# Patient Record
Sex: FEMALE | Race: WHITE | Employment: FULL TIME | URBAN - METROPOLITAN AREA
[De-identification: names, ages, dates, MRNs, and addresses within clinical notes are randomized per-mention and may not be internally consistent; named-entity substitution may affect disease eponyms.]

---

## 2021-11-08 ENCOUNTER — HOSPITAL ENCOUNTER (OUTPATIENT)
Facility: CLINIC | Age: 56
Discharge: HOME | End: 2021-11-08
Attending: EMERGENCY MEDICINE | Admitting: SURGERY
Payer: OTHER GOVERNMENT

## 2021-11-08 ENCOUNTER — ANESTHESIA (OUTPATIENT)
Dept: SURGERY | Facility: CLINIC | Age: 56
End: 2021-11-08
Payer: OTHER GOVERNMENT

## 2021-11-08 ENCOUNTER — APPOINTMENT (OUTPATIENT)
Dept: CT IMAGING | Facility: CLINIC | Age: 56
End: 2021-11-08
Attending: EMERGENCY MEDICINE
Payer: OTHER GOVERNMENT

## 2021-11-08 ENCOUNTER — ANESTHESIA EVENT (OUTPATIENT)
Dept: SURGERY | Facility: CLINIC | Age: 56
End: 2021-11-08
Payer: OTHER GOVERNMENT

## 2021-11-08 VITALS
SYSTOLIC BLOOD PRESSURE: 120 MMHG | BODY MASS INDEX: 28.17 KG/M2 | HEART RATE: 70 BPM | OXYGEN SATURATION: 99 % | TEMPERATURE: 96.9 F | RESPIRATION RATE: 12 BRPM | DIASTOLIC BLOOD PRESSURE: 68 MMHG | WEIGHT: 165 LBS | HEIGHT: 64 IN

## 2021-11-08 DIAGNOSIS — K37 APPENDICITIS, UNSPECIFIED APPENDICITIS TYPE: ICD-10-CM

## 2021-11-08 LAB
ALBUMIN UR-MCNC: 30 MG/DL
ANION GAP SERPL CALCULATED.3IONS-SCNC: 6 MMOL/L (ref 3–14)
APPEARANCE UR: CLEAR
BASOPHILS # BLD AUTO: 0 10E3/UL (ref 0–0.2)
BASOPHILS NFR BLD AUTO: 0 %
BILIRUB UR QL STRIP: NEGATIVE
BUN SERPL-MCNC: 13 MG/DL (ref 7–30)
CALCIUM SERPL-MCNC: 9.2 MG/DL (ref 8.5–10.1)
CHLORIDE BLD-SCNC: 106 MMOL/L (ref 94–109)
CO2 SERPL-SCNC: 26 MMOL/L (ref 20–32)
COLOR UR AUTO: YELLOW
CREAT SERPL-MCNC: 0.81 MG/DL (ref 0.52–1.04)
EOSINOPHIL # BLD AUTO: 0 10E3/UL (ref 0–0.7)
EOSINOPHIL NFR BLD AUTO: 0 %
ERYTHROCYTE [DISTWIDTH] IN BLOOD BY AUTOMATED COUNT: 12.3 % (ref 10–15)
GFR SERPL CREATININE-BSD FRML MDRD: 82 ML/MIN/1.73M2
GLUCOSE BLD-MCNC: 129 MG/DL (ref 70–99)
GLUCOSE UR STRIP-MCNC: NEGATIVE MG/DL
HCT VFR BLD AUTO: 42.2 % (ref 35–47)
HGB BLD-MCNC: 14.4 G/DL (ref 11.7–15.7)
HGB UR QL STRIP: ABNORMAL
HOLD SPECIMEN: NORMAL
IMM GRANULOCYTES # BLD: 0.1 10E3/UL
IMM GRANULOCYTES NFR BLD: 0 %
KETONES UR STRIP-MCNC: 100 MG/DL
LEUKOCYTE ESTERASE UR QL STRIP: NEGATIVE
LYMPHOCYTES # BLD AUTO: 0.8 10E3/UL (ref 0.8–5.3)
LYMPHOCYTES NFR BLD AUTO: 5 %
MCH RBC QN AUTO: 30.1 PG (ref 26.5–33)
MCHC RBC AUTO-ENTMCNC: 34.1 G/DL (ref 31.5–36.5)
MCV RBC AUTO: 88 FL (ref 78–100)
MONOCYTES # BLD AUTO: 0.9 10E3/UL (ref 0–1.3)
MONOCYTES NFR BLD AUTO: 6 %
MUCOUS THREADS #/AREA URNS LPF: PRESENT /LPF
NEUTROPHILS # BLD AUTO: 12.9 10E3/UL (ref 1.6–8.3)
NEUTROPHILS NFR BLD AUTO: 89 %
NITRATE UR QL: NEGATIVE
NRBC # BLD AUTO: 0 10E3/UL
NRBC BLD AUTO-RTO: 0 /100
PH UR STRIP: 6.5 [PH] (ref 5–7)
PLATELET # BLD AUTO: 319 10E3/UL (ref 150–450)
POTASSIUM BLD-SCNC: 3.5 MMOL/L (ref 3.4–5.3)
RBC # BLD AUTO: 4.78 10E6/UL (ref 3.8–5.2)
RBC URINE: 21 /HPF
SARS-COV-2 RNA RESP QL NAA+PROBE: NEGATIVE
SODIUM SERPL-SCNC: 138 MMOL/L (ref 133–144)
SP GR UR STRIP: 1.03 (ref 1–1.03)
SQUAMOUS EPITHELIAL: 5 /HPF
UROBILINOGEN UR STRIP-MCNC: NORMAL MG/DL
WBC # BLD AUTO: 14.6 10E3/UL (ref 4–11)
WBC URINE: 3 /HPF

## 2021-11-08 PROCEDURE — 96374 THER/PROPH/DIAG INJ IV PUSH: CPT | Mod: 59

## 2021-11-08 PROCEDURE — 250N000009 HC RX 250: Performed by: NURSE ANESTHETIST, CERTIFIED REGISTERED

## 2021-11-08 PROCEDURE — 250N000011 HC RX IP 250 OP 636: Performed by: NURSE ANESTHETIST, CERTIFIED REGISTERED

## 2021-11-08 PROCEDURE — 74177 CT ABD & PELVIS W/CONTRAST: CPT

## 2021-11-08 PROCEDURE — 370N000017 HC ANESTHESIA TECHNICAL FEE, PER MIN: Performed by: SURGERY

## 2021-11-08 PROCEDURE — 250N000011 HC RX IP 250 OP 636: Performed by: EMERGENCY MEDICINE

## 2021-11-08 PROCEDURE — 258N000003 HC RX IP 258 OP 636: Performed by: NURSE ANESTHETIST, CERTIFIED REGISTERED

## 2021-11-08 PROCEDURE — C9803 HOPD COVID-19 SPEC COLLECT: HCPCS

## 2021-11-08 PROCEDURE — 258N000001 HC RX 258: Performed by: SURGERY

## 2021-11-08 PROCEDURE — 81001 URINALYSIS AUTO W/SCOPE: CPT | Performed by: EMERGENCY MEDICINE

## 2021-11-08 PROCEDURE — 258N000003 HC RX IP 258 OP 636: Performed by: ANESTHESIOLOGY

## 2021-11-08 PROCEDURE — 96361 HYDRATE IV INFUSION ADD-ON: CPT | Mod: 59

## 2021-11-08 PROCEDURE — 44970 LAPAROSCOPY APPENDECTOMY: CPT | Performed by: SURGERY

## 2021-11-08 PROCEDURE — 250N000009 HC RX 250: Performed by: SURGERY

## 2021-11-08 PROCEDURE — 96375 TX/PRO/DX INJ NEW DRUG ADDON: CPT | Mod: 59

## 2021-11-08 PROCEDURE — 710N000009 HC RECOVERY PHASE 1, LEVEL 1, PER MIN: Performed by: SURGERY

## 2021-11-08 PROCEDURE — 99285 EMERGENCY DEPT VISIT HI MDM: CPT | Mod: 25

## 2021-11-08 PROCEDURE — 360N000076 HC SURGERY LEVEL 3, PER MIN: Performed by: SURGERY

## 2021-11-08 PROCEDURE — 258N000003 HC RX IP 258 OP 636: Performed by: EMERGENCY MEDICINE

## 2021-11-08 PROCEDURE — 80048 BASIC METABOLIC PNL TOTAL CA: CPT | Performed by: EMERGENCY MEDICINE

## 2021-11-08 PROCEDURE — 272N000001 HC OR GENERAL SUPPLY STERILE: Performed by: SURGERY

## 2021-11-08 PROCEDURE — 36415 COLL VENOUS BLD VENIPUNCTURE: CPT | Performed by: EMERGENCY MEDICINE

## 2021-11-08 PROCEDURE — 250N000009 HC RX 250: Performed by: ANESTHESIOLOGY

## 2021-11-08 PROCEDURE — 250N000009 HC RX 250: Performed by: EMERGENCY MEDICINE

## 2021-11-08 PROCEDURE — 999N000141 HC STATISTIC PRE-PROCEDURE NURSING ASSESSMENT: Performed by: SURGERY

## 2021-11-08 PROCEDURE — 710N000012 HC RECOVERY PHASE 2, PER MINUTE: Performed by: SURGERY

## 2021-11-08 PROCEDURE — 88304 TISSUE EXAM BY PATHOLOGIST: CPT | Mod: TC | Performed by: SURGERY

## 2021-11-08 PROCEDURE — 87635 SARS-COV-2 COVID-19 AMP PRB: CPT | Performed by: EMERGENCY MEDICINE

## 2021-11-08 PROCEDURE — 85025 COMPLETE CBC W/AUTO DIFF WBC: CPT | Performed by: EMERGENCY MEDICINE

## 2021-11-08 RX ORDER — PHENYLEPHRINE HYDROCHLORIDE 10 MG/ML
INJECTION INTRAVENOUS PRN
Status: DISCONTINUED | OUTPATIENT
Start: 2021-11-08 | End: 2021-11-08

## 2021-11-08 RX ORDER — ONDANSETRON 4 MG/1
4-8 TABLET, ORALLY DISINTEGRATING ORAL EVERY 8 HOURS PRN
Qty: 4 TABLET | Refills: 0 | Status: SHIPPED | OUTPATIENT
Start: 2021-11-08

## 2021-11-08 RX ORDER — IBUPROFEN 600 MG/1
600 TABLET, FILM COATED ORAL EVERY 6 HOURS PRN
Qty: 30 TABLET | Refills: 0 | COMMUNITY
Start: 2021-11-08

## 2021-11-08 RX ORDER — NALOXONE HYDROCHLORIDE 0.4 MG/ML
0.2 INJECTION, SOLUTION INTRAMUSCULAR; INTRAVENOUS; SUBCUTANEOUS
Status: DISCONTINUED | OUTPATIENT
Start: 2021-11-08 | End: 2021-11-08 | Stop reason: HOSPADM

## 2021-11-08 RX ORDER — HYDROMORPHONE HYDROCHLORIDE 1 MG/ML
0.5 INJECTION, SOLUTION INTRAMUSCULAR; INTRAVENOUS; SUBCUTANEOUS EVERY 4 HOURS PRN
Status: DISCONTINUED | OUTPATIENT
Start: 2021-11-08 | End: 2021-11-08 | Stop reason: HOSPADM

## 2021-11-08 RX ORDER — OXYCODONE HYDROCHLORIDE 5 MG/1
5 TABLET ORAL EVERY 4 HOURS PRN
Status: DISCONTINUED | OUTPATIENT
Start: 2021-11-08 | End: 2021-11-08 | Stop reason: HOSPADM

## 2021-11-08 RX ORDER — FENTANYL CITRATE 50 UG/ML
INJECTION, SOLUTION INTRAMUSCULAR; INTRAVENOUS PRN
Status: DISCONTINUED | OUTPATIENT
Start: 2021-11-08 | End: 2021-11-08

## 2021-11-08 RX ORDER — NEOSTIGMINE METHYLSULFATE 1 MG/ML
VIAL (ML) INJECTION PRN
Status: DISCONTINUED | OUTPATIENT
Start: 2021-11-08 | End: 2021-11-08

## 2021-11-08 RX ORDER — ACETAMINOPHEN 325 MG/1
650 TABLET ORAL
Status: DISCONTINUED | OUTPATIENT
Start: 2021-11-08 | End: 2021-11-08 | Stop reason: HOSPADM

## 2021-11-08 RX ORDER — DEXAMETHASONE SODIUM PHOSPHATE 4 MG/ML
INJECTION, SOLUTION INTRA-ARTICULAR; INTRALESIONAL; INTRAMUSCULAR; INTRAVENOUS; SOFT TISSUE PRN
Status: DISCONTINUED | OUTPATIENT
Start: 2021-11-08 | End: 2021-11-08

## 2021-11-08 RX ORDER — ONDANSETRON 2 MG/ML
INJECTION INTRAMUSCULAR; INTRAVENOUS PRN
Status: DISCONTINUED | OUTPATIENT
Start: 2021-11-08 | End: 2021-11-08

## 2021-11-08 RX ORDER — ACETAMINOPHEN 325 MG/1
650 TABLET ORAL EVERY 4 HOURS PRN
Qty: 50 TABLET | Refills: 0 | COMMUNITY
Start: 2021-11-08

## 2021-11-08 RX ORDER — SODIUM CHLORIDE, SODIUM LACTATE, POTASSIUM CHLORIDE, CALCIUM CHLORIDE 600; 310; 30; 20 MG/100ML; MG/100ML; MG/100ML; MG/100ML
INJECTION, SOLUTION INTRAVENOUS CONTINUOUS
Status: DISCONTINUED | OUTPATIENT
Start: 2021-11-08 | End: 2021-11-08 | Stop reason: HOSPADM

## 2021-11-08 RX ORDER — IBUPROFEN 200 MG
600 TABLET ORAL DAILY
COMMUNITY

## 2021-11-08 RX ORDER — LIDOCAINE 40 MG/G
CREAM TOPICAL
Status: DISCONTINUED | OUTPATIENT
Start: 2021-11-08 | End: 2021-11-08 | Stop reason: HOSPADM

## 2021-11-08 RX ORDER — GLYCOPYRROLATE 0.2 MG/ML
INJECTION, SOLUTION INTRAMUSCULAR; INTRAVENOUS PRN
Status: DISCONTINUED | OUTPATIENT
Start: 2021-11-08 | End: 2021-11-08

## 2021-11-08 RX ORDER — FENTANYL CITRATE 50 UG/ML
50 INJECTION, SOLUTION INTRAMUSCULAR; INTRAVENOUS EVERY 5 MIN PRN
Status: DISCONTINUED | OUTPATIENT
Start: 2021-11-08 | End: 2021-11-08 | Stop reason: HOSPADM

## 2021-11-08 RX ORDER — ONDANSETRON 2 MG/ML
4 INJECTION INTRAMUSCULAR; INTRAVENOUS ONCE
Status: COMPLETED | OUTPATIENT
Start: 2021-11-08 | End: 2021-11-08

## 2021-11-08 RX ORDER — KETOROLAC TROMETHAMINE 15 MG/ML
15 INJECTION, SOLUTION INTRAMUSCULAR; INTRAVENOUS ONCE
Status: COMPLETED | OUTPATIENT
Start: 2021-11-08 | End: 2021-11-08

## 2021-11-08 RX ORDER — PROPOFOL 10 MG/ML
INJECTION, EMULSION INTRAVENOUS PRN
Status: DISCONTINUED | OUTPATIENT
Start: 2021-11-08 | End: 2021-11-08

## 2021-11-08 RX ORDER — FENTANYL CITRATE 50 UG/ML
25 INJECTION, SOLUTION INTRAMUSCULAR; INTRAVENOUS
Status: DISCONTINUED | OUTPATIENT
Start: 2021-11-08 | End: 2021-11-08 | Stop reason: HOSPADM

## 2021-11-08 RX ORDER — ONDANSETRON 4 MG/1
4 TABLET, ORALLY DISINTEGRATING ORAL EVERY 30 MIN PRN
Status: DISCONTINUED | OUTPATIENT
Start: 2021-11-08 | End: 2021-11-08 | Stop reason: HOSPADM

## 2021-11-08 RX ORDER — LABETALOL HYDROCHLORIDE 5 MG/ML
10 INJECTION, SOLUTION INTRAVENOUS EVERY 5 MIN PRN
Status: DISCONTINUED | OUTPATIENT
Start: 2021-11-08 | End: 2021-11-08 | Stop reason: HOSPADM

## 2021-11-08 RX ORDER — HYDROMORPHONE HYDROCHLORIDE 1 MG/ML
0.5 INJECTION, SOLUTION INTRAMUSCULAR; INTRAVENOUS; SUBCUTANEOUS ONCE
Status: COMPLETED | OUTPATIENT
Start: 2021-11-08 | End: 2021-11-08

## 2021-11-08 RX ORDER — ONDANSETRON 2 MG/ML
4 INJECTION INTRAMUSCULAR; INTRAVENOUS EVERY 6 HOURS PRN
Status: DISCONTINUED | OUTPATIENT
Start: 2021-11-08 | End: 2021-11-08 | Stop reason: HOSPADM

## 2021-11-08 RX ORDER — BUPIVACAINE HYDROCHLORIDE AND EPINEPHRINE 2.5; 5 MG/ML; UG/ML
30 INJECTION, SOLUTION EPIDURAL; INFILTRATION; INTRACAUDAL; PERINEURAL ONCE
Status: COMPLETED | OUTPATIENT
Start: 2021-11-08 | End: 2021-11-08

## 2021-11-08 RX ORDER — OXYCODONE HYDROCHLORIDE 5 MG/1
5-10 TABLET ORAL EVERY 4 HOURS PRN
Qty: 6 TABLET | Refills: 0 | Status: SHIPPED | OUTPATIENT
Start: 2021-11-08

## 2021-11-08 RX ORDER — NALOXONE HYDROCHLORIDE 0.4 MG/ML
0.4 INJECTION, SOLUTION INTRAMUSCULAR; INTRAVENOUS; SUBCUTANEOUS
Status: DISCONTINUED | OUTPATIENT
Start: 2021-11-08 | End: 2021-11-08 | Stop reason: HOSPADM

## 2021-11-08 RX ORDER — ONDANSETRON 2 MG/ML
4 INJECTION INTRAMUSCULAR; INTRAVENOUS EVERY 30 MIN PRN
Status: DISCONTINUED | OUTPATIENT
Start: 2021-11-08 | End: 2021-11-08 | Stop reason: HOSPADM

## 2021-11-08 RX ORDER — IOPAMIDOL 755 MG/ML
500 INJECTION, SOLUTION INTRAVASCULAR ONCE
Status: COMPLETED | OUTPATIENT
Start: 2021-11-08 | End: 2021-11-08

## 2021-11-08 RX ORDER — SODIUM CHLORIDE, SODIUM LACTATE, POTASSIUM CHLORIDE, CALCIUM CHLORIDE 600; 310; 30; 20 MG/100ML; MG/100ML; MG/100ML; MG/100ML
INJECTION, SOLUTION INTRAVENOUS CONTINUOUS PRN
Status: DISCONTINUED | OUTPATIENT
Start: 2021-11-08 | End: 2021-11-08

## 2021-11-08 RX ORDER — ALBUTEROL SULFATE 0.83 MG/ML
2.5 SOLUTION RESPIRATORY (INHALATION) EVERY 4 HOURS PRN
Status: DISCONTINUED | OUTPATIENT
Start: 2021-11-08 | End: 2021-11-08 | Stop reason: HOSPADM

## 2021-11-08 RX ORDER — MEPERIDINE HYDROCHLORIDE 25 MG/ML
12.5 INJECTION INTRAMUSCULAR; INTRAVENOUS; SUBCUTANEOUS
Status: DISCONTINUED | OUTPATIENT
Start: 2021-11-08 | End: 2021-11-08 | Stop reason: HOSPADM

## 2021-11-08 RX ADMIN — GLYCOPYRROLATE 0.2 MG: 0.2 INJECTION, SOLUTION INTRAMUSCULAR; INTRAVENOUS at 05:15

## 2021-11-08 RX ADMIN — LIDOCAINE HYDROCHLORIDE 50 MG: 10 INJECTION, SOLUTION EPIDURAL; INFILTRATION; INTRACAUDAL; PERINEURAL at 05:02

## 2021-11-08 RX ADMIN — PHENYLEPHRINE HYDROCHLORIDE 100 MCG: 10 INJECTION INTRAVENOUS at 05:12

## 2021-11-08 RX ADMIN — IOPAMIDOL 85 ML: 755 INJECTION, SOLUTION INTRAVENOUS at 03:07

## 2021-11-08 RX ADMIN — DEXAMETHASONE SODIUM PHOSPHATE 4 MG: 4 INJECTION, SOLUTION INTRA-ARTICULAR; INTRALESIONAL; INTRAMUSCULAR; INTRAVENOUS; SOFT TISSUE at 05:04

## 2021-11-08 RX ADMIN — KETOROLAC TROMETHAMINE 15 MG: 15 INJECTION, SOLUTION INTRAMUSCULAR; INTRAVENOUS at 02:25

## 2021-11-08 RX ADMIN — SODIUM CHLORIDE, POTASSIUM CHLORIDE, SODIUM LACTATE AND CALCIUM CHLORIDE: 600; 310; 30; 20 INJECTION, SOLUTION INTRAVENOUS at 05:10

## 2021-11-08 RX ADMIN — TAZOBACTAM SODIUM AND PIPERACILLIN SODIUM 4.5 G: 500; 4 INJECTION, SOLUTION INTRAVENOUS at 03:51

## 2021-11-08 RX ADMIN — GLYCOPYRROLATE 0.2 MG: 0.2 INJECTION, SOLUTION INTRAMUSCULAR; INTRAVENOUS at 05:58

## 2021-11-08 RX ADMIN — SODIUM CHLORIDE 61 ML: 9 INJECTION, SOLUTION INTRAVENOUS at 03:07

## 2021-11-08 RX ADMIN — FENTANYL CITRATE 50 MCG: 50 INJECTION, SOLUTION INTRAMUSCULAR; INTRAVENOUS at 05:28

## 2021-11-08 RX ADMIN — SODIUM CHLORIDE 1000 ML: 9 INJECTION, SOLUTION INTRAVENOUS at 02:24

## 2021-11-08 RX ADMIN — FENTANYL CITRATE 50 MCG: 50 INJECTION, SOLUTION INTRAMUSCULAR; INTRAVENOUS at 05:34

## 2021-11-08 RX ADMIN — ONDANSETRON 4 MG: 2 INJECTION INTRAMUSCULAR; INTRAVENOUS at 02:24

## 2021-11-08 RX ADMIN — HYDROMORPHONE HYDROCHLORIDE 0.5 MG: 1 INJECTION, SOLUTION INTRAMUSCULAR; INTRAVENOUS; SUBCUTANEOUS at 02:27

## 2021-11-08 RX ADMIN — SODIUM CHLORIDE, POTASSIUM CHLORIDE, SODIUM LACTATE AND CALCIUM CHLORIDE: 600; 310; 30; 20 INJECTION, SOLUTION INTRAVENOUS at 05:12

## 2021-11-08 RX ADMIN — ONDANSETRON HYDROCHLORIDE 4 MG: 2 INJECTION, SOLUTION INTRAVENOUS at 05:52

## 2021-11-08 RX ADMIN — PROPOFOL 200 MG: 10 INJECTION, EMULSION INTRAVENOUS at 05:02

## 2021-11-08 RX ADMIN — NEOSTIGMINE METHYLSULFATE 2 MG: 1 INJECTION, SOLUTION INTRAVENOUS at 05:58

## 2021-11-08 RX ADMIN — ROCURONIUM BROMIDE 50 MG: 50 INJECTION, SOLUTION INTRAVENOUS at 05:03

## 2021-11-08 RX ADMIN — FENTANYL CITRATE 50 MCG: 50 INJECTION, SOLUTION INTRAMUSCULAR; INTRAVENOUS at 05:02

## 2021-11-08 RX ADMIN — PHENYLEPHRINE HYDROCHLORIDE 100 MCG: 10 INJECTION INTRAVENOUS at 05:15

## 2021-11-08 ASSESSMENT — ENCOUNTER SYMPTOMS
DYSURIA: 0
CHILLS: 0
NAUSEA: 1
SHORTNESS OF BREATH: 0
DIARRHEA: 0
FEVER: 0
COUGH: 0
ABDOMINAL PAIN: 1
VOMITING: 1

## 2021-11-08 ASSESSMENT — MIFFLIN-ST. JEOR: SCORE: 1328.44

## 2021-11-08 NOTE — ANESTHESIA CARE TRANSFER NOTE
Patient: Alona Yoder    Procedure: Procedure(s):  APPENDECTOMY, LAPAROSCOPIC       Diagnosis: * No pre-op diagnosis entered *  Diagnosis Additional Information: No value filed.    Anesthesia Type:   General     Note:    Oropharynx: oropharynx clear of all foreign objects and spontaneously breathing  Level of Consciousness: awake  Oxygen Supplementation: face mask  Level of Supplemental Oxygen (L/min / FiO2): 6  Independent Airway: airway patency satisfactory and stable  Dentition: dentition unchanged  Vital Signs Stable: post-procedure vital signs reviewed and stable  Report to RN Given: handoff report given  Patient transferred to: PACU  Comments: No issues, denies pain   Handoff Report: Identifed the Patient, Identified the Reponsible Provider, Reviewed the pertinent medical history, Discussed the surgical course, Reviewed Intra-OP anesthesia mangement and issues during anesthesia and Allowed opportunity for questions and acknowledgement of understanding      Vitals:  Vitals Value Taken Time   BP     Temp     Pulse     Resp     SpO2         Electronically Signed By: NORTH Dewitt CRNA  November 8, 2021  6:11 AM

## 2021-11-08 NOTE — H&P
[facility]  General Surgery Consult    Alona Yoder MRN# [id number]   Age: 56 year old YOB: 1965     HPI:  The patient has been experiencing abdominal pain for the past 2 days. The pain started RLQ and is currently in the RLQ. Started while at work on a night shift Friday into Saturday am. The pain associated with nausea and vomiting, many times over the past day.  Negative for associated fever, diarrhea, constipation, shortness of breath, dysuria    History is obtained from the patient.  Daughter at bedside  She is vaccinated for COVID-19    Review Of Systems:  The 10 point review of systems is negative other than noted in the HPI.    PMH:  No past medical history on file.    PSH:  Knee surgeries distant past    Allergies:  No Known Allergies    Home Medications:  No current outpatient medications on file.       Social History:  No smoking   No ETOH    Family History:  No family history on file.   Mom has heart disease    No family history chronic diarrhea, inflammatory bowel disease or colon cancer.    No bleeding disorders, clotting disorders, or problems with anesthesia.    Physical Exam:  /67   Pulse 74   Temp 97.7  F (36.5  C) (Oral)   Resp 14   LMP 11/08/2020   SpO2 91%   General appearance: Resting Comfortably in bed, no apparent distress  Eyes: conjunctiva clear, pupils equally round and reactive.   Mouth: Mucus membranes moist.  Neck: without lymphadenopathy or masses  Lungs: Clear to auscultation bilaterally  Heart: regular rate and rhythm, no murmurs, rubs, or gallops  Abdomen: flat   Tenderness: present: RLQ mild, negative rebound tenderness   Masses: none   Organomegaly: none  Extremities: Without clubbing, cyanosis, edema  Neurologic: Grossly intact times four extremities, alert and oriented times three  Psychiatric: Mood and affect are appropriate  Skin: Without lesions or rashes      Labs Reviewed:  Lab Results   Component Value Date    WBC 14.6 11/08/2021      Lab Results   Component Value Date    HGB 14.4 11/08/2021     Lab Results   Component Value Date     11/08/2021     Last Basic Metabolic Panel:  Lab Results   Component Value Date     11/08/2021      Lab Results   Component Value Date    POTASSIUM 3.5 11/08/2021     Lab Results   Component Value Date    CHLORIDE 106 11/08/2021     Lab Results   Component Value Date    MODESTO 9.2 11/08/2021     Lab Results   Component Value Date    CO2 26 11/08/2021     Lab Results   Component Value Date    BUN 13 11/08/2021     Lab Results   Component Value Date    CR 0.81 11/08/2021     Lab Results   Component Value Date     11/08/2021       Radiology:  All imaging studies reviewed by me.    Results for orders placed or performed during the hospital encounter of 11/08/21   CT Abdomen Pelvis w Contrast    Narrative    EXAM: CT ABDOMEN PELVIS W CONTRAST  LOCATION: [facility]  DATE/TIME: 11/8/2021 3:03 AM    INDICATION: RLQ abdominal pain, appendicitis suspected (Age >= 14y).  COMPARISON: None.  TECHNIQUE: CT scan of the abdomen and pelvis was performed following injection of IV contrast. Multiplanar reformats were obtained. Dose reduction techniques were used.  CONTRAST: 85 mL Isovue-370.    FINDINGS:   LOWER CHEST: Normal.    HEPATOBILIARY: Normal.    PANCREAS: Normal.    SPLEEN: Normal.    ADRENAL GLANDS: Normal.    KIDNEYS/BLADDER: Normal.    BOWEL: Evidence for acute uncomplicated appendicitis with appendiceal dilatation at 0.9 cm within moderate surrounding inflammatory edema and wall thickening. No perforation or abscess formation. No bowel dilatation or other sites of inflammatory change.    LYMPH NODES: No lymphadenopathy.    VASCULATURE: Normal caliber abdominal aorta. Moderate atherosclerotic vascular calcification.    PELVIC ORGANS: Minimal free fluid in the pelvis.    MUSCULOSKELETAL: Minimal degenerative changes lower lumbar facets.      Impression    IMPRESSION:   1.  Acute  uncomplicated appendicitis with appendiceal dilatation at 0.9 cm with wall thickening, mucosal hyperenhancement and moderate surrounding inflammatory edema. No evidence for luminal appendicolith.    2.  Moderate atherosclerotic vascular calcification of the abdominal aorta.         ASSESSMENT/PLAN:  The patient's history, physical exam, laboratory and imaging studies are suspicious for acute appendicitis.  I have offered the patient a laparoscopic appendectomy.      We have had a detailed discussion of nature of appendicitis, the procedure, its risks, benefits, alternatives, recovery, postop limitations, anesthesia, bleeding,  DVT, postoperative infections, injury to adjacent organs and structures, open conversion, bowel resection, prolonged convalescence in the event of gangrene or perforation of the appendix, abdominal wall hernia.   All questions have been answered to the best of my ability.     COVID-19 testing done due to pandemic and was negative prior to surgery     She elects to proceed.        Stacy Douglas MD    Time spent with the patient, reviewing the EMR, reviewing laboratory and imaging studies, more than 50% of which was counseling and coordinating care:  60 minutes.

## 2021-11-08 NOTE — ANESTHESIA POSTPROCEDURE EVALUATION
Patient: Alona Yoder    Procedure: Procedure(s):  APPENDECTOMY, LAPAROSCOPIC       Diagnosis:* No pre-op diagnosis entered *  Diagnosis Additional Information: No value filed.    Anesthesia Type:  General    Note:  Disposition: Inpatient   Postop Pain Control: Uneventful            Sign Out: Well controlled pain   PONV: No   Neuro/Psych: Uneventful            Sign Out: Acceptable/Baseline neuro status   Airway/Respiratory: Uneventful            Sign Out: Acceptable/Baseline resp. status   CV/Hemodynamics: Uneventful            Sign Out: Acceptable CV status; No obvious hypovolemia; No obvious fluid overload   Other NRE: NONE   DID A NON-ROUTINE EVENT OCCUR? No           Last vitals:  Vitals Value Taken Time   /49 11/08/21 0645   Temp     Pulse 70 11/08/21 0649   Resp 19 11/08/21 0649   SpO2 100 % 11/08/21 0649   Vitals shown include unvalidated device data.    Electronically Signed By: Leo Alexander MD  November 8, 2021  10:07 AM

## 2021-11-08 NOTE — LETTER
[facility] PREOP/POSTOP  [address] [facility] [address]  [phone] November 8, 2021    RE:  Alona Yoder                                                                                                                                                       [address] [facility] [address]            To whom it may concern:    Alona Yoder is under my professional care for Appendicitis and underwent emergency surgery on 11/8/21. She  may return to work with the following: The employee is ABLE to return to work 11/15/21 pending the healing process.    When the patient returns to work, the following restrictions apply until 11/22/21:  A) Bend: Occasionally (1-3 hours)  B) Squat: Occasionally (1-3 hours)  C) Walk/Stand: Occasionally (1-3 hours)  D) Reach Above Shoulders: Occasionally (1-3 hours)  E) Lift, carry, push, and pull no more than:  11-20 lbs. No working or lifting restrictions on or about 11/22/21.      Sincerely,        Stacy Douglas MD

## 2021-11-08 NOTE — OP NOTE
General Surgery Operative Note      Pre-operative diagnosis: acute appendicitis   Post-operative diagnosis: same    Procedure: laparoscopic appendectomy   Surgeon: Stacy Douglas MD   Assistant(s): NONE   Anesthesia: general    Estimated blood loss:  Complications: 1 cc  none   Specimens: ID Type Source Tests Collected by Time Destination   1 : appendix Tissue Appendix SURGICAL PATHOLOGY EXAM Stacy Douglas MD 11/8/2021  5:59 AM         INDICATION FOR PROCEDURE: This is a 56 year old healthy female who presented with abdominal pain of 2 days duration. CT scan of the abdomen was consistent with acute appendicitis without evidence for abscess or perforation. We discussed operative management of appendicitis including risks and benefits, and the patient agreed to proceed.    DESCRIPTION OF PROCEDURE:  The patient was placed on the table in supine position.  General endotracheal anesthesia was induced and the abdomen was prepped and draped in standard sterile fashion.  A 5mm visiport trocar was placed in the left upper quadrant. Pneumoperitoneum was established and the abdomen was surveyed. A 5 mm trocar was placed in the suprapubic region, and a 12mm trocar was placed in supraumbilical position under direct visualization.  The patient was placed in Trendelenburg and right side up.  The appendix was identified in the right lower quadrant.  It was adhered to the lateral cecum. Ligasure was used to dissect the mesoappendix and lateral attachments. The base of the appendix was quite thickened and a cuff of cecum was taken with the appendix with 2 loads of the Endo-DEWARD stapler.  The appendix was passed into an Endocatch bag.  The right lower quadrant was inspected for hemostasis.  Hemostasis was assured.  We irrigated with sterile saline and aspirated the effluent.    The 12mm port and the endocatch bag was removed from the abdomen and the fascia was closed with 0 vicryl on a Mckay Jamal.  The skin of all 3 incisions  was anesthetized with local anesthetic and closed with interrupted 4-0 Vicryl subcuticular sutures and Steri-Strips.  The patient tolerated the procedure well.  Sponge and instrument counts were correct.    FINDINGS: acute appendicitis, no perforation, densely adhered to lateral cecum.    Stacy Douglas MD

## 2021-11-08 NOTE — ED PROVIDER NOTES
History     Chief Complaint:  Abdominal Pain     The history is provided by the patient.      lAona Yoder is a 56 year old female with history of nicotine dependence who presents with just over one day of right lower quadrant abdominal pain. Since the onset, the pain has increased in intensity. The pain is sharp, non-radiating, and has no exacerbating or alleviating factors. She has had associated nausea and vomiting as well. Alona denies fever, chills, cough, chest pain, dyspnea, diarrhea, dysuria, vaginal bleeding. No known sick contacts. No abnormal/suspicious dietary intake. She is vaccinated against COVID-19.    Review of Systems   Constitutional: Negative for chills and fever.   Respiratory: Negative for cough and shortness of breath.    Cardiovascular: Negative for chest pain.   Gastrointestinal: Positive for abdominal pain, nausea and vomiting. Negative for diarrhea.   Genitourinary: Negative for dysuria and vaginal bleeding.   All other systems reviewed and are negative.    Allergies:  The patient has no known allergies.     Medications:  The patient is currently on no regular medications.    Past Medical History:    Nicotine dependence    Past Surgical History:    ACL reconstruction    Family History:    Father: cancer, hyperlipidemia  Mother: heart disease, hypertension, osteoporosis, thyroid disease  Sister: thyroid disease  Brother: non-Hodgkin's lymphoma    Social History:  Patient presents to the ED alone.  Patient presents to the ED via car.    Physical Exam     Patient Vitals for the past 24 hrs:   BP Temp Temp src Pulse Resp SpO2   11/08/21 0300 -- -- -- -- -- 97 %   11/08/21 0245 115/67 -- -- 74 -- 91 %   11/08/21 0200 131/72 -- -- 81 -- --   11/08/21 0057 (!) 126/94 97.7  F (36.5  C) Oral 104 14 98 %       Physical Exam  Nursing note and vitals reviewed.  Constitutional: Well nourished.   Eyes: Conjunctiva normal.  Pupils are equal, round, and reactive to light.   ENT: Nose normal. Mucous  membranes pink and moist.    Neck: Normal range of motion.  CVS: Sinus tachycardia.  Normal heart sounds.  No murmur.  Pulmonary: Lungs clear to auscultation bilaterally. No wheezes/rales/rhonchi.  GI: Abdomen soft. RLQ tenderness. McBurney's positive. No CVA tenderness  MSK: No calf tenderness or swelling.  Neuro: Alert. Follows simple commands.  Skin: Skin is warm and dry. No rash noted.   Psychiatric: Normal affect.       Emergency Department Course     Imaging:  CT Abdomen Pelvis w Contrast   Final Result   IMPRESSION:    1.  Acute uncomplicated appendicitis with appendiceal dilatation at 0.9 cm with wall thickening, mucosal hyperenhancement and moderate surrounding inflammatory edema. No evidence for luminal appendicolith.      2.  Moderate atherosclerotic vascular calcification of the abdominal aorta.      Reading per radiology     Laboratory:  Labs Ordered and Resulted from Time of ED Arrival to Time of ED Departure   ROUTINE UA WITH MICROSCOPIC REFLEX TO CULTURE - Abnormal       Result Value    Color Urine Yellow      Appearance Urine Clear      Glucose Urine Negative      Bilirubin Urine Negative      Ketones Urine 100  (*)     Specific Gravity Urine 1.028      Blood Urine Small (*)     pH Urine 6.5      Protein Albumin Urine 30  (*)     Urobilinogen Urine Normal      Nitrite Urine Negative      Leukocyte Esterase Urine Negative      Mucus Urine Present (*)     RBC Urine 21 (*)     WBC Urine 3      Squamous Epithelials Urine 5 (*)    BASIC METABOLIC PANEL - Abnormal    Sodium 138      Potassium 3.5      Chloride 106      Carbon Dioxide (CO2) 26      Anion Gap 6      Urea Nitrogen 13      Creatinine 0.81      Calcium 9.2      Glucose 129 (*)     GFR Estimate 82     CBC WITH PLATELETS AND DIFFERENTIAL - Abnormal    WBC Count 14.6 (*)     RBC Count 4.78      Hemoglobin 14.4      Hematocrit 42.2      MCV 88      MCH 30.1      MCHC 34.1      RDW 12.3      Platelet Count 319      % Neutrophils 89      %  Lymphocytes 5      % Monocytes 6      % Eosinophils 0      % Basophils 0      % Immature Granulocytes 0      NRBCs per 100 WBC 0      Absolute Neutrophils 12.9 (*)     Absolute Lymphocytes 0.8      Absolute Monocytes 0.9      Absolute Eosinophils 0.0      Absolute Basophils 0.0      Absolute Immature Granulocytes 0.1 (*)     Absolute NRBCs 0.0     COVID-19 VIRUS (CORONAVIRUS) BY PCR - Normal    SARS CoV2 PCR Negative       Emergency Department Course:    Reviewed:  I reviewed nursing notes, vitals, past medical history, care everywhere    Assessments:  0206 I obtained history and examined the patient as noted above.   0325 I rechecked the patient and explained findings.     Consults:   0328 I consulted Dr. Douglas from general surgery regarding the patient's presentation and plan of care.     Interventions:  0224 NS 1 L IV  0224 Zofran 4 mg IV  0225 Toradol 15 mg IV  0227 Dilaudid 0.5 mg IV  0330  Zosyn 4.5g IV    Disposition:  The patient was transferred to the OR under the care of Dr. Douglas.    Impression & Plan     Medical Decision Making:  Alona Yoder is a 56 year old female who presents with abdominal pain and the CT scan confirms appendicitis.  This is consistent with her clinical exam.  There is no evidence of rupture or abscess at this time. Her pain has been controlled with interventions in the Emergency Department.  Noted leukocytosis though no evidence to suggest severe sepsis.  IV antibiotics started in the Emergency Department. The case was discussed with the on-call surgeon and the patient will be going to the operating room.  An inpatient bed has been arranged for after surgery.  Patient is hemodynamically stable in ED.      Diagnosis:    ICD-10-CM    1. Appendicitis, unspecified appendicitis type  K37 Case Request: APPENDECTOMY, LAPAROSCOPIC     Case Request: APPENDECTOMY, LAPAROSCOPIC       Scribe Disclosure:  Nette PIÑA, am serving as a scribe at 2:06 AM on 11/8/2021 to document services  personally performed by Sarah Dent DO based on my observations and the provider's statements to me.       Sarah Dent,   11/08/21 2055

## 2021-11-08 NOTE — DISCHARGE INSTRUCTIONS
HOME CARE FOLLOWING APPENDECTOMY  NATHAN Mares, FRAN Angulo R. O Donnell, J. Shaheen    INCISIONAL CARE:  Replace the bandage over your incision(s) until all drainage stops, or if more comfortable to have in place.  If present, leave the steri-strips (white paper tapes) in place for 14 days after surgery.  If Dermabond (a type of skin glue) is present, leave in place until it wears/flakes off (2-3 weeks).     BATHING:  OK to shower 24 hours after surgery.  Avoid baths for 1 week after surgery.  You may wash your hair at any time.  Gently pat your incision dry after bathing.  Do not apply lotions, creams, or ointments to incisions.    ACTIVITY:  Light Activity -- you may immediately be up and about as tolerated.  Walking is encouraged, increase as tolerated.  Driving/Light Work-- when comfortable and off narcotic pain medications.  Strenuous Work/Activity -- limit lifting to 20 pounds for 2 weeks.  Progressively increase with time.  Active Sports (running, biking, etc.) -- cautiously resume after 2 weeks.    DISCOMFORT:  Local anesthetic placed at surgery should provide relief for 4-8 hours.  Begin taking pain pills before discomfort is severe.  Take the pain medication with some food, when possible, to minimize side effects.  Intermittent use of ice packs may help during the first 1-3 weeks after surgery.  Expect gradual improvement.    For the first 72 hours after surgery, take the following (can be obtained over the counter)  - Ibuprofen (motrin) 600mg every 6 hours (max 2,400mg per day)   - Tylenol (acetaminophen) 500-1000mg every 6 hours (max 4,000mg per day)  - Take with food if GI upset occurs  - If additional pain medication is needed, take the narcotic that you were prescribed.  - After 72 hours, take the above pain medication only as needed.  Over-the-counter anti-inflammatory medications (i.e. Ibuprofen/Advil/Motrin or Naprosyn/Aleve) may be used per package instructions in  "addition to or while tapering off the narcotic pain medications to decrease swelling and sensitivity.  DO NOT TAKE these Anti-inflammatory medications if your primary physician has advised against doing so, or if you have acid reflux, ulcer, or bleeding disorder, or take blood-thinner medications.  Call your primary physician or the surgery office if you have medication questions.  You may have decreased energy level for 1-2 weeks after surgery related to your recovery.    DIET:  Start with liquids and gradually resume your regular diet as tolerated.  Consider eating yogurt or taking a probiotic to help your \"gut ruthie\" (good bacteria in the bowel/colon) return to normal while taking or after receiving antibiotics.  Drink plenty of fluids.  While taking pain medications, consider use of a stool softener, increase your fiber in your diet, or add a fiber supplement (like Metamucil, Citrucel) to help prevent constipation - a possible side effect of pain medications.    NAUSEA:  If nauseated from the anesthetic/pain meds; rest in bed, get up cautiously with assistance, and drink clear liquids (juice, tea, broth).    FOLLOW-UP AFTER SURGERY:  -Our office will contact you approximately 2-3 weeks after surgery to check on your progress and answer any questions you may have.  If you are doing well, you will not need to return for an office appointment.  If any concerns are identified over the phone, we will help you make an appointment to see a provider.    -If you have not received a phone call, have any questions or concerns, or would like to be seen, please call us at [phone].  We are located at: [address], [address]; [place] [address]    -CONTACT US IF THE FOLLOWING DEVELOPS:   1. A fever that is above 101     2. Increased redness, warmth, drainage, bleeding, or swelling.   3. Pain that is not relieved by rest/ice and your prescription.   4.  Increasing pain after 48 hours.   5. Drainage that is thick, " cloudy, yellow, green or white.   6. Any other questions or concerns.      FREQUENTLY ASKED QUESTIONS:    Q:  How should my incision look?    A:  Normally your incision will appear slightly swollen with light redness directly along the incision itself as it heals.  It may feel like a bump or ridge as the healing/scarring happens, and over time (3-4 months) this bump or ridge feeling should slowly go away.  In general, clear or pink watery drainage can be normal at first as your incision heals, but should decrease over time.    Q:  How do I know if my incision is infected?  A:  Look at your incision for signs of infection, like redness around the incision spreading to surrounding skin, or drainage of cloudy or foul-smelling drainage.  If you feel warm, check your temperature to see if you are running a fever.    **If any of these things occur, please notify the nurse at our office.  We may need you to come into the office for an incision check.      Q:  How do I take care of my incision?  A:  If you have a dressing in place - Starting the day after surgery, replace the dressing 1-2 times a day until there is no further drainage from the incision.  At that time, a dressing is no longer needed.  Try to minimize tape on the skin if irritation is occurring at the tape sites.  If you have significant irritation from tape on the skin, please call the office to discuss other method of dressing your incision.    Small pieces of tape called  steri-strips  may be present directly overlying your incision; these may be removed 10 days after surgery unless otherwise specified by your surgeon.  If these tapes start to loosen at the ends, you may trim them back until they fall off or are removed.    A:  If you had  Dermabond  tissue glue used as a dressing (this causes your incision to look shiny with a clear covering over it) - This type of dressing wears off with time and does not require more dressings over the top unless it is  draining around the glue as it wears off.  Do not apply ointments or lotions over the incisions until the glue has completely worn off.    Q:  There is a piece of tape or a sticky  lead  still on my skin.  Can I remove this?  A:  Sometimes the sticky  leads  used for monitoring during surgery or for evaluation in the emergency department are not all removed while you are in the hospital.  These sometimes have a tab or metal dot on them.  You can easily remove these on your own, like taking off a band-aid.  If there is a gel substance under the  lead , simply wipe/clean it off with a washcloth or paper towel.      Q:  What can I do to minimize constipation (very hard stools, or lack of stools)?  A:  Stay well hydrated.  Increase your dietary fiber intake or take a fiber supplement -with plenty of water.  Walk around frequently.  You may consider an over-the-counter stool-softener.  Your Pharmacist can assist you with choosing one that is stocked at your pharmacy.  Constipation is also one of the most common side effects of pain medication.  If you are using pain medication, be pro-active and try to PREVENT problems with constipation by taking the steps above BEFORE constipation becomes a problem.    Q:  What do I do if I need more pain medications?  A:  Call the office to receive refills.  Be aware that certain pain meds cannot be called into a pharmacy and actually require a paper prescription.  A change may be made in your pain med as you progress thru your recovery period or if you have side effects to certain meds.    --Pain meds are NOT refilled after 5pm on weekdays, and NOT AT ALL on the weekends, so please look ahead to prevent problems.    Q:  Why am I having a hard time sleeping now that I am at home?  A:  Many medications you receive while you are in the hospital can impact your sleep for a number of days after your surgery/hospitalization.  Decreased level of activity and naps during the day may also make  sleeping at night difficult.  Try to minimize day-time naps, and get up frequently during the day to walk around your home during your recovery time.  Sleep aides may be of some help, but are not recommended for long-term use.      Q:  I am having some back discomfort.  What should I do?  A:  This may be related to certain positioning that was required for your surgery, extended periods of time in bed, or other changes in your overall activity level.  You may try ice, heat, acetaminophen, or ibuprofen to treat this temporarily.  Note that many pain medications have acetaminophen in them and would state this on the prescription bottle.  Be sure not to exceed the maximum of 4000mg per day of acetaminophen.     **If the pain you are having does not resolve, is severe, or is a flare of back pain you have had on other occasions prior to surgery, please contact your primary physician for further recommendations or for an appointment to be examined at their office.    Q:  Why am I having headaches?  A:  Headaches can be caused by many things:  caffeine withdrawal, use of pain meds, dehydration, high blood pressure, lack of sleep, over-activity/exhaustion, flare-up of usual migraine headaches.  If you feel this is related to muscle tension (a band-like feeling around the head, or a pressure at the low-back of the head) you may try ice or heat to this area.  You may need to drink more fluids (try electrolyte drink like Gatorade), rest, or take your usual migraine medications.   **If your headaches do not resolve, worsen, are accompanied by other symptoms, or if your blood pressure is high, please call your primary physician for recommendation and/or examination.    Q:  I am unable to urinate.  What do I do?  A:  A small percentage of people can have difficulty urinating initially after surgery.  This includes being able to urinate only a very small amount at a time and feeling discomfort or pressure in the very low abdomen.   This is called  urinary retention , and is actually an urgent situation.  Proceed to your nearest Emergency department for evaluation (not an Urgent Care Center).  Sometimes the bladder does not work correctly after certain medications you receive during surgery, or related to certain procedures.  You may need to have a catheter placed until your bladder recovers.  When planning to go to an Emergency department, it may help to call the ER to let them know you are coming in for this problem after a surgery.  This may help you get in quicker to be evaluated.  **If you have symptoms of a urinary tract infection, please contact your primary physician for the proper evaluation and treatment.        If you have other questions, please call the office Monday thru Friday between 8am and 5pm to discuss with the Nurse or Physician Assistant.  #[phone]    There is a surgeon ON CALL on weekday evenings and over the weekend in case of urgent need only, and may be contacted at the same number.    If you are having an emergency, call 911 or proceed to your nearest emergency department.  GENERAL ANESTHESIA OR SEDATION ADULT DISCHARGE INSTRUCTIONS   SPECIAL PRECAUTIONS FOR 24 HOURS AFTER SURGERY    IT IS NOT UNUSUAL TO FEEL LIGHT-HEADED OR FAINT, UP TO 24 HOURS AFTER SURGERY OR WHILE TAKING PAIN MEDICATION.  IF YOU HAVE THESE SYMPTOMS; SIT FOR A FEW MINUTES BEFORE STANDING AND HAVE SOMEONE ASSIST YOU WHEN YOU GET UP TO WALK OR USE THE BATHROOM.    YOU SHOULD REST AND RELAX FOR THE NEXT 24 HOURS AND YOU MUST MAKE ARRANGEMENTS TO HAVE SOMEONE STAY WITH YOU FOR AT LEAST 24 HOURS AFTER YOUR DISCHARGE.  AVOID HAZARDOUS AND STRENUOUS ACTIVITIES.  DO NOT MAKE IMPORTANT DECISIONS FOR 24 HOURS.    DO NOT DRIVE ANY VEHICLE OR OPERATE MECHANICAL EQUIPMENT FOR 24 HOURS FOLLOWING THE END OF YOUR SURGERY.  EVEN THOUGH YOU MAY FEEL NORMAL, YOUR REACTIONS MAY BE AFFECTED BY THE MEDICATION YOU HAVE RECEIVED.    DO NOT DRINK ALCOHOLIC  BEVERAGES FOR 24 HOURS FOLLOWING YOUR SURGERY.    DRINK CLEAR LIQUIDS (APPLE JUICE, GINGER ALE, 7-UP, BROTH, ETC.).  PROGRESS TO YOUR REGULAR DIET AS YOU FEEL ABLE.    YOU MAY HAVE A DRY MOUTH, A SORE THROAT, MUSCLES ACHES OR TROUBLE SLEEPING.  THESE SHOULD GO AWAY AFTER 24 HOURS.    CALL YOUR DOCTOR FOR ANY OF THE FOLLOWING:  SIGNS OF INFECTION (FEVER, GROWING TENDERNESS AT THE SURGERY SITE, A LARGE AMOUNT OF DRAINAGE OR BLEEDING, SEVERE PAIN, FOUL-SMELLING DRAINAGE, REDNESS OR SWELLING.    IT HAS BEEN OVER 8 TO 10 HOURS SINCE SURGERY AND YOU ARE STILL NOT ABLE TO URINATE (PASS WATER).   Maximum acetaminophen (Tylenol) dose from all sources should not exceed 4 grams (4000 mg) per day.  You received Toradol, an IV form of ibuprofen (Motrin) at 2:25 AM.  Do not take any ibuprofen products until 8:25 AM.

## 2021-11-08 NOTE — ED TRIAGE NOTES
Pt states RLQ abdominal pain and bilateral lower back pain since 11/06. Pt also notes nausea. Pt denies urinary frequency or dysuria. ABCs intact GCS 15

## 2021-11-08 NOTE — ANESTHESIA PREPROCEDURE EVALUATION
Anesthesia Pre-Procedure Evaluation    Patient: Alona Yoder   MRN: [id number] : 1965        Preoperative Diagnosis: * No pre-op diagnosis entered *    Procedure : Procedure(s):  APPENDECTOMY, LAPAROSCOPIC          History reviewed. No pertinent past medical history.   Past Surgical History:   Procedure Laterality Date     ARTHROSCOPIC REPAIR ACL Left       No Known Allergies   Social History     Tobacco Use     Smoking status: Former Smoker     Packs/day: 0.50     Years: 30.00     Pack years: 15.00     Types: Cigarettes     Quit date: 2011     Years since quitting: 10.0     Smokeless tobacco: Never Used   Substance Use Topics     Alcohol use: Never      Wt Readings from Last 1 Encounters:   21 74.8 kg (165 lb)        Anesthesia Evaluation   Pt has had prior anesthetic. Type: General.    No history of anesthetic complications       ROS/MED HX  ENT/Pulmonary:  - neg pulmonary ROS     Neurologic:  - neg neurologic ROS     Cardiovascular:  - neg cardiovascular ROS     METS/Exercise Tolerance:     Hematologic:  - neg hematologic  ROS     Musculoskeletal:  - neg musculoskeletal ROS     GI/Hepatic:     (+) appendicitis,     Renal/Genitourinary:  - neg Renal ROS     Endo:  - neg endo ROS     Psychiatric/Substance Use:  - neg psychiatric ROS     Infectious Disease:  - neg infectious disease ROS     Malignancy:  - neg malignancy ROS     Other:  - neg other ROS          Physical Exam    Airway        Mallampati: II   TM distance: > 3 FB   Neck ROM: full   Mouth opening: > 3 cm    Respiratory Devices and Support         Dental  no notable dental history         Cardiovascular   cardiovascular exam normal          Pulmonary   pulmonary exam normal                OUTSIDE LABS:  CBC:   Lab Results   Component Value Date    WBC 14.6 (H) 2021    HGB 14.4 2021    HCT 42.2 2021     2021     BMP:   Lab Results   Component Value Date     2021    POTASSIUM 3.5 2021     CHLORIDE 106 11/08/2021    CO2 26 11/08/2021    BUN 13 11/08/2021    CR 0.81 11/08/2021     (H) 11/08/2021     COAGS: No results found for: PTT, INR, FIBR  POC: No results found for: BGM, HCG, HCGS  HEPATIC: No results found for: ALBUMIN, PROTTOTAL, ALT, AST, GGT, ALKPHOS, BILITOTAL, BILIDIRECT, KEVIN  OTHER:   Lab Results   Component Value Date    MODESTO 9.2 11/08/2021       Anesthesia Plan    ASA Status:  1   NPO Status:  NPO Appropriate    Anesthesia Type: General.     - Airway: ETT   Induction: Intravenous, Propofol.   Maintenance: Balanced.        Consents    Anesthesia Plan(s) and associated risks, benefits, and realistic alternatives discussed. Questions answered and patient/representative(s) expressed understanding.     - Discussed with:  Patient         Postoperative Care    Pain management: IV analgesics, Oral pain medications, Multi-modal analgesia.   PONV prophylaxis: Ondansetron (or other 5HT-3), Dexamethasone or Solumedrol     Comments:                Angelito Watts MD

## 2021-11-09 LAB
PATH REPORT.COMMENTS IMP SPEC: NORMAL
PATH REPORT.COMMENTS IMP SPEC: NORMAL
PATH REPORT.FINAL DX SPEC: NORMAL
PATH REPORT.GROSS SPEC: NORMAL
PATH REPORT.MICROSCOPIC SPEC OTHER STN: NORMAL
PATH REPORT.RELEVANT HX SPEC: NORMAL
PHOTO IMAGE: NORMAL

## 2021-11-09 PROCEDURE — 88304 TISSUE EXAM BY PATHOLOGIST: CPT | Mod: 26 | Performed by: PATHOLOGY

## 2021-11-29 ENCOUNTER — TELEPHONE (OUTPATIENT)
Dept: SURGERY | Facility: CLINIC | Age: 56
End: 2021-11-29
Payer: OTHER GOVERNMENT

## 2021-11-29 DIAGNOSIS — Z98.890 POSTOPERATIVE STATE: Primary | ICD-10-CM

## 2021-11-29 PROCEDURE — 99024 POSTOP FOLLOW-UP VISIT: CPT | Performed by: PHYSICIAN ASSISTANT

## 2021-11-29 NOTE — TELEPHONE ENCOUNTER
[facility]   Postoperative Follow-up Phone Call  -Call to patient to review recent procedure and recovery    Procedure Date:  November/08  Surgeon:  Dr. Douglas  Procedure:  Laparoscopic appendectomy  Pathology, reviewed with patient:  acute appendicitis    She is now 3 weeks postop.   She feels her recovery has been progressing nicely.  Rx/OTC pain medication was used appropriately postop.  She is feeling well, tolerating diet, normal bowel function, no fevers/chills, and denies incision concerns.  Incisions healing well.  Following restrictions per surgeon; 30lb weight limit for 3 weeks postop.    RTW date: has returned to work; no issues.  Additional findings on CT in ED:  Moderate atherosclerotic vascular calcification of the abdominal aorta.  Pt made aware of these findings.  No additional imaging needed at this time.  Patient denies h/o dyslipidemia, does not take cholesterol meds.  Recommended patient to review this with her PCP at next yearly exam; assess cardiac risk factors, etc, for consideration of additional imaging or eval as needed.    Pt concerns:  none    She may increase activity as tolerated.  She may continue to utilize OTC pain management options as well as use of ice/heat to sites for comfort.  She should expect progressive resolution of the healing ridge along the incisional sites, normalizing bowel function, and improvement of food intolerances over the following 2-3 months.    Pt is recommended to contact the office if worsening pain, onset of fever/redness at any inc site, or new drainage from the area.  Pt also recommended to call office at any time if ongoing questions/concerns during recovery, but otherwise may follow-up on a prn basis.  Alona is in agreement with this plan.    Em Donahue PA-C    Please route or send letter to:  Primary Care Provider (PCP)

## (undated) DEVICE — ENDO POUCH UNIV RETRIEVAL SYSTEM INZII 10MM CD001

## (undated) DEVICE — LINEN TOWEL PACK X10 5473

## (undated) DEVICE — ENDO TROCAR SLEEVE KII Z-THREADED 05X100MM CTS02

## (undated) DEVICE — GLOVE PROTEXIS BLUE W/NEU-THERA 7.0  2D73EB70

## (undated) DEVICE — SUCTION CANISTER MEDIVAC LINER 3000ML W/LID 65651-530

## (undated) DEVICE — SU VICRYL 0 UR-6 27" J603H

## (undated) DEVICE — SU VICRYL 4-0 PS-2 18" UND J496H

## (undated) DEVICE — ESU ELEC BLADE 2.75" COATED/INSULATED E1455

## (undated) DEVICE — STPL POWERED ECHELON 45MM PSEE45A

## (undated) DEVICE — SUCTION IRR STRYKERFLOW II W/TIP 250-070-520

## (undated) DEVICE — LINEN POUCH DBL 5427

## (undated) DEVICE — SOL NACL 0.9% INJ 1000ML BAG 2B1324X

## (undated) DEVICE — BAG CLEAR TRASH 1.3M 39X33" P4040C

## (undated) DEVICE — ENDO TROCAR FIRST ENTRY KII FIOS Z-THRD 05X100MM CTF03

## (undated) DEVICE — ESU PENCIL W/HOLSTER E2350H

## (undated) DEVICE — ESU LIGASURE MARYLAND LAPAROSCOPIC SLR/DVDR 5MMX37CM LF1937

## (undated) DEVICE — LINEN FULL SHEET 5511

## (undated) DEVICE — BLADE KNIFE SURG 11 371111

## (undated) DEVICE — LINEN HALF SHEET 5512

## (undated) DEVICE — LINEN DRAPE 54X72" 5467

## (undated) DEVICE — Device

## (undated) DEVICE — ESU CORD MONOPOLAR 10'  E0510

## (undated) DEVICE — SOL NACL 0.9% IRRIG 1000ML BOTTLE 2F7124

## (undated) DEVICE — ESU GROUND PAD ADULT W/CORD E7507

## (undated) DEVICE — GLOVE PROTEXIS MICRO 6.5  2D73PM65

## (undated) RX ORDER — FENTANYL CITRATE 50 UG/ML
INJECTION, SOLUTION INTRAMUSCULAR; INTRAVENOUS
Status: DISPENSED
Start: 2021-11-08